# Patient Record
Sex: MALE | Race: WHITE | NOT HISPANIC OR LATINO | Employment: FULL TIME | URBAN - METROPOLITAN AREA
[De-identification: names, ages, dates, MRNs, and addresses within clinical notes are randomized per-mention and may not be internally consistent; named-entity substitution may affect disease eponyms.]

---

## 2019-02-21 ENCOUNTER — OFFICE VISIT (OUTPATIENT)
Dept: FAMILY MEDICINE | Facility: CLINIC | Age: 49
End: 2019-02-21
Payer: COMMERCIAL

## 2019-02-21 VITALS
OXYGEN SATURATION: 98 % | DIASTOLIC BLOOD PRESSURE: 72 MMHG | TEMPERATURE: 97.3 F | HEART RATE: 86 BPM | SYSTOLIC BLOOD PRESSURE: 121 MMHG | WEIGHT: 182 LBS | BODY MASS INDEX: 26.96 KG/M2 | HEIGHT: 69 IN

## 2019-02-21 DIAGNOSIS — B35.1 TOENAIL FUNGUS: ICD-10-CM

## 2019-02-21 DIAGNOSIS — Z00.00 ENCOUNTER FOR ANNUAL PHYSICAL EXAM: ICD-10-CM

## 2019-02-21 DIAGNOSIS — Z13.6 CARDIOVASCULAR SCREENING; LDL GOAL LESS THAN 160: Primary | ICD-10-CM

## 2019-02-21 PROCEDURE — 36415 COLL VENOUS BLD VENIPUNCTURE: CPT | Performed by: FAMILY MEDICINE

## 2019-02-21 PROCEDURE — 99213 OFFICE O/P EST LOW 20 MIN: CPT | Mod: 25 | Performed by: FAMILY MEDICINE

## 2019-02-21 PROCEDURE — 99386 PREV VISIT NEW AGE 40-64: CPT | Performed by: FAMILY MEDICINE

## 2019-02-21 PROCEDURE — 80053 COMPREHEN METABOLIC PANEL: CPT | Performed by: FAMILY MEDICINE

## 2019-02-21 PROCEDURE — 80061 LIPID PANEL: CPT | Performed by: FAMILY MEDICINE

## 2019-02-21 ASSESSMENT — MIFFLIN-ST. JEOR: SCORE: 1688.05

## 2019-02-21 NOTE — LETTER
February 25, 2019      Isaac Zapata  [address]  [place] [address]        Dear ,    We are writing to inform you of your test results.    I have reviewed your recent labs. Here are the results:     -LDL(bad) cholesterol level is elevated, HDL(good) cholesterol level is low which can increase your heart disease risk.  A diet high in fat and simple carbohydrates, genetics and being overweight can contribute to this. ADVISE: exercising 150 minutes of aerobic exercise per week (30 minutes for 5 days per week or 50 minutes for 3 days per week are options) and eating a low saturated fat/low carbohydrate diet are helpful to improve this. In 12 months, you should recheck your fasting cholesterol panel     -Liver and gallbladder tests are normal (ALT,AST, Alk phos, bilirubin), kidney function is normal (Cr, GFR), sodium is normal, potassium is normal, calcium is normal, glucose is normal.     Resulted Orders   Comprehensive metabolic panel   Result Value Ref Range    Sodium 141 133 - 144 mmol/L    Potassium 4.0 3.4 - 5.3 mmol/L    Chloride 107 94 - 109 mmol/L    Carbon Dioxide 28 20 - 32 mmol/L    Anion Gap 6 3 - 14 mmol/L    Glucose 91 70 - 99 mg/dL      Comment:      Fasting specimen    Urea Nitrogen 11 7 - 30 mg/dL    Creatinine 0.86 0.66 - 1.25 mg/dL    GFR Estimate >90 >60 mL/min/[1.73_m2]      Comment:      Non  GFR Calc  Starting 12/18/2018, serum creatinine based estimated GFR (eGFR) will be   calculated using the Chronic Kidney Disease Epidemiology Collaboration   (CKD-EPI) equation.      GFR Estimate If Black >90 >60 mL/min/[1.73_m2]      Comment:       GFR Calc  Starting 12/18/2018, serum creatinine based estimated GFR (eGFR) will be   calculated using the Chronic Kidney Disease Epidemiology Collaboration   (CKD-EPI) equation.      Calcium 9.1 8.5 - 10.1 mg/dL    Bilirubin Total 0.7 0.2 - 1.3 mg/dL    Albumin 4.5 3.4 - 5.0 g/dL    Protein Total 8.2 6.8 -  8.8 g/dL    Alkaline Phosphatase 77 40 - 150 U/L    ALT 68 0 - 70 U/L    AST 28 0 - 45 U/L   Lipid panel reflex to direct LDL Fasting   Result Value Ref Range    Cholesterol 198 <200 mg/dL    Triglycerides 101 <150 mg/dL      Comment:      Fasting specimen    HDL Cholesterol 33 (L) >39 mg/dL    LDL Cholesterol Calculated 145 (H) <100 mg/dL      Comment:      Above desirable:  100-129 mg/dl  Borderline High:  130-159 mg/dL  High:             160-189 mg/dL  Very high:       >189 mg/dl      Non HDL Cholesterol 165 (H) <130 mg/dL      Comment:      Above Desirable:  130-159 mg/dl  Borderline high:  160-189 mg/dl  High:             190-219 mg/dl  Very high:       >219 mg/dl         If you have any questions or concerns, please call the clinic at the number listed above.       Sincerely,        Blu Spain MD

## 2019-02-21 NOTE — PROGRESS NOTES
SUBJECTIVE:   CC: Isaac Zapata is an 48 year old male who presents for preventive health visit.     Healthy Habits:    Do you get at least three servings of calcium containing foods daily (dairy, green leafy vegetables, etc.)? yes    Amount of exercise or daily activities, outside of work:  30 min a day     Problems taking medications regularly No    Medication side effects: No    Have you had an eye exam in the past two years? no    Do you see a dentist twice per year? yes    Do you have sleep apnea, excessive snoring or daytime drowsiness?yes  Snoring sometimes       Patient is overall healthy but he does have toenails which are thickened and noticed slight worsening with thickening and discoloration.  Would like to start some antifungal medication to see if that helps.    Today's PHQ-2 Score:   PHQ-2 ( 1999 Pfizer) 2/21/2019   Q1: Little interest or pleasure in doing things 0   Q2: Feeling down, depressed or hopeless 0   PHQ-2 Score 0       Abuse: Current or Past(Physical, Sexual or Emotional)- No  Do you feel safe in your environment? Yes    Social History     Tobacco Use     Smoking status: Never Smoker     Smokeless tobacco: Never Used   Substance Use Topics     Alcohol use: Yes     Comment: rarely     If you drink alcohol do you typically have >3 drinks per day or >7 drinks per week? No                      Last PSA: No results found for: PSA    Reviewed orders with patient. Reviewed health maintenance and updated orders accordingly - Yes      Reviewed and updated as needed this visit by clinical staff  Allergies  Meds         Reviewed and updated as needed this visit by Provider            ROS:  CONSTITUTIONAL: NEGATIVE for fever, chills, change in weight  INTEGUMENTARY/SKIN: NEGATIVE for worrisome rashes, moles or lesions  EYES: NEGATIVE for vision changes or irritation  ENT: NEGATIVE for ear, mouth and throat problems  RESP: NEGATIVE for significant cough or SOB  CV: NEGATIVE for chest pain,  "palpitations or peripheral edema  GI: NEGATIVE for nausea, abdominal pain, heartburn, or change in bowel habits   male: negative for dysuria, hematuria, decreased urinary stream, erectile dysfunction, urethral discharge  MUSCULOSKELETAL: NEGATIVE for significant arthralgias or myalgia  NEURO: NEGATIVE for weakness, dizziness or paresthesias  PSYCHIATRIC: NEGATIVE for changes in mood or affect    OBJECTIVE:   Pulse 86   Temp 97.3  F (36.3  C) (Tympanic)   Ht 1.74 m (5' 8.5\")   Wt 82.6 kg (182 lb)   SpO2 98%   BMI 27.27 kg/m    EXAM:  GENERAL: healthy, alert and no distress  EYES: Eyes grossly normal to inspection, PERRL and conjunctivae and sclerae normal  HENT: ear canals and TM's normal, nose and mouth without ulcers or lesions  NECK: no adenopathy, no asymmetry, masses, or scars and thyroid normal to palpation  RESP: lungs clear to auscultation - no rales, rhonchi or wheezes  CV: regular rate and rhythm, normal S1 S2, no S3 or S4, no murmur, click or rub, no peripheral edema and peripheral pulses strong  ABDOMEN: soft, nontender, no hepatosplenomegaly, no masses and bowel sounds normal  MS: no gross musculoskeletal defects noted, no edema  SKIN: no suspicious lesions or rashes  NEURO: Normal strength and tone, mentation intact and speech normal  PSYCH: mentation appears normal, affect normal/bright  Bilateral nail have thickened appearence with some discoloration.    ASSESSMENT/PLAN:   1. CARDIOVASCULAR SCREENING; LDL GOAL LESS THAN 160    - Comprehensive metabolic panel  - Lipid panel reflex to direct LDL Fasting    2. Encounter for annual physical exam    - Comprehensive metabolic panel  - Lipid panel reflex to direct LDL Fasting    3. Toenail fungus  We discussed about antifungal medication Lamisil.  I advised him we need to check his liver function now and while taking the medication needs to get his liver function checked every month.  Once I reviewed his liver function is within normal I will send the " "medication Lamisil 250 mg daily.. I defects were discussed with the patient      COUNSELING:  Reviewed preventive health counseling, as reflected in patient instructions       Regular exercise       Healthy diet/nutrition    BP Readings from Last 1 Encounters:   08/24/09 120/60     Estimated body mass index is 27.27 kg/m  as calculated from the following:    Height as of this encounter: 1.74 m (5' 8.5\").    Weight as of this encounter: 82.6 kg (182 lb).           reports that  has never smoked. he has never used smokeless tobacco.      Counseling Resources:  ATP IV Guidelines  Pooled Cohorts Equation Calculator  FRAX Risk Assessment  ICSI Preventive Guidelines  Dietary Guidelines for Americans, 2010  USDA's MyPlate  ASA Prophylaxis  Lung CA Screening    Blu Spain MD  [facility] [place]  "

## 2019-02-22 LAB
ALBUMIN SERPL-MCNC: 4.5 G/DL (ref 3.4–5)
ALP SERPL-CCNC: 77 U/L (ref 40–150)
ALT SERPL W P-5'-P-CCNC: 68 U/L (ref 0–70)
ANION GAP SERPL CALCULATED.3IONS-SCNC: 6 MMOL/L (ref 3–14)
AST SERPL W P-5'-P-CCNC: 28 U/L (ref 0–45)
BILIRUB SERPL-MCNC: 0.7 MG/DL (ref 0.2–1.3)
BUN SERPL-MCNC: 11 MG/DL (ref 7–30)
CALCIUM SERPL-MCNC: 9.1 MG/DL (ref 8.5–10.1)
CHLORIDE SERPL-SCNC: 107 MMOL/L (ref 94–109)
CHOLEST SERPL-MCNC: 198 MG/DL
CO2 SERPL-SCNC: 28 MMOL/L (ref 20–32)
CREAT SERPL-MCNC: 0.86 MG/DL (ref 0.66–1.25)
GFR SERPL CREATININE-BSD FRML MDRD: >90 ML/MIN/{1.73_M2}
GLUCOSE SERPL-MCNC: 91 MG/DL (ref 70–99)
HDLC SERPL-MCNC: 33 MG/DL
LDLC SERPL CALC-MCNC: 145 MG/DL
NONHDLC SERPL-MCNC: 165 MG/DL
POTASSIUM SERPL-SCNC: 4 MMOL/L (ref 3.4–5.3)
PROT SERPL-MCNC: 8.2 G/DL (ref 6.8–8.8)
SODIUM SERPL-SCNC: 141 MMOL/L (ref 133–144)
TRIGL SERPL-MCNC: 101 MG/DL

## 2019-02-27 ENCOUNTER — TELEPHONE (OUTPATIENT)
Dept: FAMILY MEDICINE | Facility: CLINIC | Age: 49
End: 2019-02-27

## 2019-02-27 NOTE — TELEPHONE ENCOUNTER
Faxed patient's biometric form to [phone]      .Mehreen FARIAS    [facility]

## 2020-02-06 ENCOUNTER — OFFICE VISIT - HEALTHEAST (OUTPATIENT)
Dept: FAMILY MEDICINE | Facility: CLINIC | Age: 50
End: 2020-02-06

## 2020-02-06 ENCOUNTER — COMMUNICATION - HEALTHEAST (OUTPATIENT)
Dept: TELEHEALTH | Facility: CLINIC | Age: 50
End: 2020-02-06

## 2020-02-06 DIAGNOSIS — Z71.84 COUNSELING ABOUT TRAVEL: ICD-10-CM

## 2020-02-06 ASSESSMENT — MIFFLIN-ST. JEOR: SCORE: 1712.91

## 2020-07-28 ENCOUNTER — VIRTUAL VISIT (OUTPATIENT)
Dept: FAMILY MEDICINE | Facility: CLINIC | Age: 50
End: 2020-07-28
Payer: COMMERCIAL

## 2020-07-28 DIAGNOSIS — H01.004 BLEPHARITIS OF LEFT UPPER EYELID, UNSPECIFIED TYPE: Primary | ICD-10-CM

## 2020-07-28 PROCEDURE — 99213 OFFICE O/P EST LOW 20 MIN: CPT | Mod: 95 | Performed by: FAMILY MEDICINE

## 2020-07-28 RX ORDER — NEOMYCIN/POLYMYXIN B/HYDROCORT 3.5-10K-1
2 SUSPENSION, DROPS(FINAL DOSAGE FORM)(ML) OPHTHALMIC (EYE) 4 TIMES DAILY
Qty: 7.5 ML | Refills: 0 | Status: SHIPPED | OUTPATIENT
Start: 2020-07-28 | End: 2020-08-04

## 2020-07-28 NOTE — PROGRESS NOTES
"Isaac Zapata is a 50 year old male who is being evaluated via a billable video visit.      The patient has been notified of following:     \"This video visit will be conducted via a call between you and your physician/provider. We have found that certain health care needs can be provided without the need for an in-person physical exam.  This service lets us provide the care you need with a video conversation.  If a prescription is necessary we can send it directly to your pharmacy.  If lab work is needed we can place an order for that and you can then stop by our lab to have the test done at a later time.    Video visits are billed at different rates depending on your insurance coverage.  Please reach out to your insurance provider with any questions.    If during the course of the call the physician/provider feels a video visit is not appropriate, you will not be charged for this service.\"    Patient has given verbal consent for Video visit? Yes  How would you like to obtain your AVS? MyChart  If you are dropped from the video visit, the video invite should be resent to: Send to e-mail at: [email or web address]  Will anyone else be joining your video visit? No      Subjective     Isaac Zapata is a 50 year old male who presents today via video visit for the following health issues:    HPI    Eye(s) Problem      Duration: 2 days    Description:  Location: left  Pain: YES  Redness: YES  Discharge: YES    Accompanying signs and symptoms: slight swelling    History (Trauma, foreign body exposure,): None    Precipitating or alleviating factors (contact use): None    Therapies tried and outcome: None    Denies any trauma.  Denies any bug bite.  No medications tried.  Denies any pus drainage.  It is mostly teary.  This never happened before.  Video Start Time: 9:44 AM        Patient Active Problem List   Diagnosis     CARDIOVASCULAR SCREENING; LDL GOAL LESS THAN 160     No past surgical history on file.    Social History "     Tobacco Use     Smoking status: Never Smoker     Smokeless tobacco: Never Used   Substance Use Topics     Alcohol use: Yes     Comment: rarely     Family History   Problem Relation Age of Onset     Diabetes Father          age 58     Hypertension Mother      Family History Negative Sister      Family History Negative Sister      Family History Negative Brother      Family History Negative Brother      Family History Negative Brother          Current Outpatient Medications   Medication Sig Dispense Refill     neomycin-polymyxin-hydrocortisone (CORTISPORIN) 3.5-77365-3 ophthalmic suspension Place 2 drops Into the left eye 4 times daily for 7 days 7.5 mL 0     ALLEGRA 180 MG OR TABS TAKE ONE DAILY (Patient not taking: No sig reported) 30 1     Allergies   Allergen Reactions     Asa [Aspirin] Swelling       Reviewed and updated as needed this visit by Provider         Review of Systems   CONSTITUTIONAL: NEGATIVE for fever, chills, change in weight  ENT/MOUTH: NEGATIVE for ear, mouth and throat problems  RESP: NEGATIVE for significant cough or SOB  CV: NEGATIVE for chest pain, palpitations or peripheral edema      Objective             Physical Exam     GENERAL: Healthy, alert and no distress  EYES: Left upper eyelid is swollen and erythematous.  Patient does not have any pain with moving his eyeball in either direction.  Conjunctivae is clear.  No unusual drainage noted.  RESP: No audible wheeze, cough, or visible cyanosis.  No visible retractions or increased work of breathing.    NEURO: Cranial nerves grossly intact.  Mentation and speech appropriate for age.  PSYCH: Mentation appears normal, affect normal/bright, judgement and insight intact, normal speech and appearance well-groomed.              Assessment & Plan     1. Blepharitis of left upper eyelid, unspecified type  Patient has symptoms of blepharitis.  Recommending to use Cortisporin eyedrops 4 times a day.  Use ibuprofen to lower the inflammation.   Cool compresses recommended.  If any symptomatic worsening or changes noted, he is instructed to notify us back.  Patient verbalized understanding and agreement  - neomycin-polymyxin-hydrocortisone (CORTISPORIN) 3.5-06980-5 ophthalmic suspension; Place 2 drops Into the left eye 4 times daily for 7 days  Dispense: 7.5 mL; Refill: 0           No follow-ups on file.    Adithya Fuentes MD  [facility] [place]      [place]-Visit Details    Type of service:  Video Visit    Video End Time:9:53 AM    Originating Location (pt. Location): Home    [place]  [facility] [place]     Platform used for Video Visit: Well    No follow-ups on file.       Adithya Fuentes MD

## 2020-08-21 ENCOUNTER — VIRTUAL VISIT (OUTPATIENT)
Dept: FAMILY MEDICINE | Facility: CLINIC | Age: 50
End: 2020-08-21
Payer: COMMERCIAL

## 2020-08-21 DIAGNOSIS — J30.2 SEASONAL ALLERGIES: Primary | ICD-10-CM

## 2020-08-21 PROCEDURE — 99213 OFFICE O/P EST LOW 20 MIN: CPT | Mod: 95 | Performed by: INTERNAL MEDICINE

## 2020-08-21 NOTE — PROGRESS NOTES
"Isaca Zapata is a 50 year old male who is being evaluated via a billable video visit.      The patient has been notified of following:     \"This video visit will be conducted via a call between you and your physician/provider. We have found that certain health care needs can be provided without the need for an in-person physical exam.  This service lets us provide the care you need with a video conversation.  If a prescription is necessary we can send it directly to your pharmacy.  If lab work is needed we can place an order for that and you can then stop by our lab to have the test done at a later time.    Video visits are billed at different rates depending on your insurance coverage.  Please reach out to your insurance provider with any questions.    If during the course of the call the physician/provider feels a video visit is not appropriate, you will not be charged for this service.\"    Patient has given verbal consent for Video visit? Yes  How would you like to obtain your AVS? MyChart  If you are dropped from the video visit, the video invite should be resent to: Text to cell phone: [phone]  Will anyone else be joining your video visit? No    Subjective     Isaac Zapata is a 50 year old male who presents today via video visit for the following health issues:    HPI    ALLERGIES  Onset/Duration: started this AM - employer would like note stating okay to work  Symptoms:   Nasal congestion: YES  Sneezing: YES  Red, itchy eyes: Watering   Progression of Symptoms: same waxing and waning  Accompanying Signs & Symptoms:  Cough: no  Wheezing: no  Rash: no  Sinus/facial pain: YES  History:   Is it seasonal: in the fall, in the spring and in the summer   History of Asthma: no  Has allergy testing been done: no  Precipitating factors:   None  Alleviating factors:  Fatigue and muscle aches  Therapies tried and outcome: Has used Allegra in the past     Isaac is having his typical allergy symptoms. Happens every " year around this time - he has watery eyes, runny nose, sneezing, feels a little tired and achy, but nothing like the flu.  He usually takes allegra and that works pretty quickly to help him feel better, but he needs to go to the store to get some. He works as an .  He started sneezing at work this morning and his boss sent him home.  He has not had any known COVID exposures. There was a positive coworker at his work about a month ago, and he wasn't in close contact with them.       Video Start Time: 2:00 PM        Review of Systems   Constitutional, HEENT, cardiovascular, pulmonary, gi and gu systems are negative, except as otherwise noted.      Objective           Vitals:  No vitals were obtained today due to virtual visit.    Physical Exam     GENERAL: Healthy, alert and no distress  EYES: Eyes grossly normal to inspection.  No discharge or erythema, or obvious scleral/conjunctival abnormalities.  RESP: No audible wheeze, cough, or visible cyanosis.  No visible retractions or increased work of breathing.    SKIN: Visible skin clear. No significant rash, abnormal pigmentation or lesions.  NEURO: Cranial nerves grossly intact.  Mentation and speech appropriate for age.  PSYCH: Mentation appears normal, affect normal/bright, judgement and insight intact, normal speech and appearance well-groomed.              Assessment & Plan     Seasonal allergies  His symptoms are very typical for seasonal allergies.  I have a very low suspicion for COVID.  Though as with anything in medicine, nothing is 100% certain.  I have written this in a letter, he will access through [employer].  He is going to buy some allegra today.            Return in about 6 months (around 2/21/2021) for Physical Exam.    Lala Terrell MD  [facility] [place]      Video-Visit Details    Type of service:  Video Visit    Video End Time: 2:08 PM    Originating Location (pt. Location): Home    [place]  [facility] [place]     Platform used for Video Visit: [employer]

## 2020-08-21 NOTE — LETTER
[facility] [place]          [address] [place] [address]                            [phone]  Fax: [phone]    Isaac Zapata  [address] [address]    [id number]    August 21, 2020      To whom it may concern    Please excuse Isaac Zapata from work 8/21/2020.  He was evaluated via a video visit for his symptoms.  His symptoms are consistent with seasonal allergies.  I have a low suspicion for COVID and have not recommended that he be tested.  I feel it is safe for him to return to work with continued universal precautions of masking and social distancing as his job allows.  If you have any other questions or concerns please feel free to contact me at anytime.        Sincerely,     Lala Terrell MD

## 2020-10-27 ENCOUNTER — VIRTUAL VISIT (OUTPATIENT)
Dept: FAMILY MEDICINE | Facility: CLINIC | Age: 50
End: 2020-10-27
Payer: COMMERCIAL

## 2020-10-27 DIAGNOSIS — Z20.822 EXPOSURE TO COVID-19 VIRUS: Primary | ICD-10-CM

## 2020-10-27 PROCEDURE — 99213 OFFICE O/P EST LOW 20 MIN: CPT | Mod: 95 | Performed by: FAMILY MEDICINE

## 2020-10-27 NOTE — PROGRESS NOTES
"Isaac Zapata is a 50 year old male who is being evaluated via a billable video visit.      The patient has been notified of following:     \"This video visit will be conducted via a call between you and your physician/provider. We have found that certain health care needs can be provided without the need for an in-person physical exam.  This service lets us provide the care you need with a video conversation.  If a prescription is necessary we can send it directly to your pharmacy.  If lab work is needed we can place an order for that and you can then stop by our lab to have the test done at a later time.    Video visits are billed at different rates depending on your insurance coverage.  Please reach out to your insurance provider with any questions.    If during the course of the call the physician/provider feels a video visit is not appropriate, you will not be charged for this service.\"    Patient has given verbal consent for Video visit? Yes  How would you like to obtain your AVS? MyChart  If you are dropped from the video visit, the video invite should be resent to: Text to cell phone: [phone]   Will anyone else be joining your video visit? No      Subjective     Isaac Zapata is a 50 year old male who presents today via video visit for the following health issues:    HPI       Concern for COVID-19  About how many days ago did these symptoms start? Saturday   Is this your first visit for this illness? Yes  In the 14 days before your symptoms started, have you had close contact with someone with COVID-19 (Coronavirus)? Yes, I have been in contact with someone who has COVID-19/Coronavirus (confirmed by lab test).  Do you have a fever or chills? No  Are you having new or worsening difficulty breathing? No  Do you have new or worsening cough? No  Have you had any new or unexplained body aches? YES    Have you experienced any of the following NEW symptoms?    Headache: No    Sore throat: YES    Loss of taste " or smell: No    Chest pain: No    Diarrhea: No    Rash: No  What treatments have you tried?   Who do you live with? Family   Are you, or a household member, a healthcare worker or a ? No  Do you live in a nursing home, group home, or shelter? No  Do you have a way to get food/medications if quarantined? NO               Video Start Time: 13:00      Review of Systems   Constitutional, HEENT, cardiovascular, pulmonary, gi and gu systems are negative, except as otherwise noted.      Objective           Vitals:  No vitals were obtained today due to virtual visit.    Physical Exam     GENERAL: Healthy, alert and no distress  EYES: Eyes grossly normal to inspection.  No discharge or erythema, or obvious scleral/conjunctival abnormalities.  HENT: Normal cephalic/atraumatic.  External ears, nose and mouth without ulcers or lesions.  No nasal drainage visible.  NECK: No asymmetry, visible masses or scars  RESP: No audible wheeze, cough, or visible cyanosis.  No visible retractions or increased work of breathing.    SKIN: Visible skin clear. No significant rash, abnormal pigmentation or lesions.  NEURO: Cranial nerves grossly intact.  Mentation and speech appropriate for age.  PSYCH: Mentation appears normal, affect normal/bright, judgement and insight intact, normal speech and appearance well-groomed.              Assessment & Plan     Exposure to COVID-19 virus  Has worsening sinus sx with cough, has no fever yet, will have him to check covid testing for further evaluation   Encouraged him to work on self quarantine until having the test results   - Symptomatic COVID-19 Virus (Coronavirus) by PCR; Future        FUTURE APPOINTMENTS:       - Follow-up visit in 2 weeks as needed     No follow-ups on file.    Colby Hamilton MD  [facility]      Video-Visit Details    Type of service:  Video Visit    Video End Time:13:20    Originating Location (pt. Location): Home    Distant Location  (provider location):  [facility]     Platform used for Video Visit: [employer]

## 2020-10-29 DIAGNOSIS — Z20.822 EXPOSURE TO COVID-19 VIRUS: ICD-10-CM

## 2020-10-29 PROCEDURE — U0003 INFECTIOUS AGENT DETECTION BY NUCLEIC ACID (DNA OR RNA); SEVERE ACUTE RESPIRATORY SYNDROME CORONAVIRUS 2 (SARS-COV-2) (CORONAVIRUS DISEASE [COVID-19]), AMPLIFIED PROBE TECHNIQUE, MAKING USE OF HIGH THROUGHPUT TECHNOLOGIES AS DESCRIBED BY CMS-2020-01-R: HCPCS | Performed by: FAMILY MEDICINE

## 2020-10-30 ENCOUNTER — TELEPHONE (OUTPATIENT)
Dept: EMERGENCY MEDICINE | Facility: CLINIC | Age: 50
End: 2020-10-30

## 2020-10-30 LAB
SARS-COV-2 RNA SPEC QL NAA+PROBE: ABNORMAL
SPECIMEN SOURCE: ABNORMAL

## 2020-10-30 NOTE — TELEPHONE ENCOUNTER
"Coronavirus (COVID-19) Notification    Caller Name (Patient, parent, daughter/son, grandparent, etc)  Patient     Reason for call  Notify of Positive Coronavirus (COVID-19) lab results, assess symptoms,  review [facility] recommendations    Lab Result    Lab test:  2019-nCoV rRt-PCR or SARS-CoV-2 PCR    Oropharyngeal AND/OR nasopharyngeal swabs is POSITIVE for 2019-nCoV RNA/SARS-COV-2 PCR (COVID-19 virus)    RN Recommendations/Instructions per [facility] Coronavirus COVID-19 recommendations    Brief introduction script  Introduce self then review script:  \"I am calling on behalf of [employer].  We were notified that your Coronavirus test (COVID-19) for was POSITIVE for the virus.  I have some information to relay to you but first I wanted to mention that the [place] Dept of Health will be contacting you shortly [it's possible MD already called Patient] to talk to you more about how you are feeling and other people you have had contact with who might now also have the virus.  Also, [facility] is Partnering with the [facility] for Covid-19 research, you may be contacted directly by research staff.\"    Assessment (Inquire about Patient's current symptoms)   Assessment   Current Symptoms at time of phone call: (if no symptoms, document No symptoms]  headache, body aches, chills, no smell and taste, cough    Symptoms onset (if applicable)  10/24/20     If at time of call, Patients symptoms hare worsened, the Patient should contact 911 or have someone drive them to Emergency Dept promptly:      If Patient calling 911, inform 911 personal that you have tested positive for the Coronavirus (COVID-19).  Place mask on and await 911 to arrive.    If Emergency Dept, If possible, please have another adult drive you to the Emergency Dept but you need to wear mask when in contact with other people.      Review information with Patient    Your result was positive. This means you have COVID-19 " (coronavirus).  We have sent you a letter that reviews the information that I'll be reviewing with you now.    How can I protect others?    If you have symptoms: stay home and away from others (self-isolate) until:    You've had no fever--and no medicine that reduces fever--for 1 full day (24 hours). And       Your other symptoms have gotten better. For example, your cough or breathing has improved. And     At least 10 days have passed since your symptoms started. (If you've been told by a doctor that you have a weak immune system, wait 20 days.)     If you don't have symptoms: Stay home and away from others (self-isolate) until at least 10 days have passed since your first positive COVID-19 test. (Date test collected)    During this time:    Stay in your own room, including for meals. Use your own bathroom if you can.    Stay away from others in your home. No hugging, kissing or shaking hands. No visitors.     Don't go to work, school or anywhere else.     Clean  high touch  surfaces often (doorknobs, counters, handles, etc.). Use a household cleaning spray or wipes. You'll find a full list on the EPA website at www.epa.gov/pesticide-registration/list-n-disinfectants-use-against-sars-cov-2.     Cover your mouth and nose with a mask, tissue or other face covering to avoid spreading germs.    Wash your hands and face often with soap and water.    Caregivers in these groups are at risk for severe illness due to COVID-19:  o People 65 years and older  o People who live in a nursing home or long-term care facility  o People with chronic disease (lung, heart, cancer, diabetes, kidney, liver, immunologic)  o People who have a weakened immune system, including those who:  - Are in cancer treatment  - Take medicine that weakens the immune system, such as corticosteroids  - Had a bone marrow or organ transplant  - Have an immune deficiency  - Have poorly controlled HIV or AIDS  - Are obese (body mass index of 40 or  higher)  - Smoke regularly    Caregivers should wear gloves while washing dishes, handling laundry and cleaning bedrooms and bathrooms.    Wash and dry laundry with special caution. Don't shake dirty laundry, and use the warmest water setting you can.    If you have a weakened immune system, ask your doctor about other actions you should take.    For more tips, go to www.cdc.gov/coronavirus/2019-ncov/downloads/10Things.pdf.    You should not go back to work until you meet the guidelines above for ending your home isolation. You don't need to be retested for COVID-19 before going back to work--studies show that you won't spread the virus if it's been at least 10 days since your symptoms started (or 20 days, if you have a weak immune system).    Employers: This document serves as formal notice of your employee's medical guidelines for going back to work. They must meet the above guidelines before going back to work in person.    How can I take care of myself?    1. Get lots of rest. Drink extra fluids (unless a doctor has told you not to).    2. Take Tylenol (acetaminophen) for fever or pain. If you have liver or kidney problems, ask your family doctor if it's okay to take Tylenol.     Take either:     650 mg (two 325 mg pills) every 4 to 6 hours, or     1,000 mg (two 500 mg pills) every 8 hours as needed.     Note: Don't take more than 3,000 mg in one day. Acetaminophen is found in many medicines (both prescribed and over-the-counter medicines). Read all labels to be sure you don't take too much.    For children, check the Tylenol bottle for the right dose (based on their age or weight).    3. If you have other health problems (like cancer, heart failure, an organ transplant or severe kidney disease): Call your specialty clinic if you don't feel better in the next 2 days.    4. Know when to call 911: Emergency warning signs include:    Trouble breathing or shortness of breath    Pain or pressure in the chest that  doesn't go away    Feeling confused like you haven't felt before, or not being able to wake up    Bluish-colored lips or face    5. Sign up for [employer]. We know it's scary to hear that you have COVID-19. We want to track your symptoms to make sure you're okay over the next 2 weeks. Please look for an email from [employer]--this is a free, online program that we'll use to keep in touch. To sign up, follow the link in the email. Learn more at [email or web address].    Where can I get more information?    [facility]: [email or web address]    Coronavirus Basics: www.health.[facility].[place]./diseases/coronavirus/basics.html    What to Do If You're Sick: www.cdc.gov/coronavirus/2019-ncov/about/steps-when-sick.html    Ending Home Isolation: www.cdc.gov/coronavirus/2019-ncov/hcp/disposition-in-home-patients.html     Caring for Someone with COVID-19: www.cdc.gov/coronavirus/2019-ncov/if-you-are-sick/care-for-someone.html     [facility] clinical trials (COVID-19 research studies): [email or web address].[email or web address]-clinical-trials     A Positive COVID-19 letter will be sent via [employer] or the mail. (Exception, no letters sent to Presurgerical/Preprocedure Patients)    [Name]  Rinku Chowdhury RN  [facility]  COVID19 Results Team RN  Ph# [phone]

## 2021-01-15 ENCOUNTER — HEALTH MAINTENANCE LETTER (OUTPATIENT)
Age: 51
End: 2021-01-15

## 2021-04-01 ENCOUNTER — COMMUNICATION - HEALTHEAST (OUTPATIENT)
Dept: FAMILY MEDICINE | Facility: CLINIC | Age: 51
End: 2021-04-01

## 2021-04-01 DIAGNOSIS — Z23 NEED FOR VACCINATION: ICD-10-CM

## 2021-04-07 ENCOUNTER — AMBULATORY - HEALTHEAST (OUTPATIENT)
Dept: NURSING | Facility: CLINIC | Age: 51
End: 2021-04-07

## 2021-04-07 ENCOUNTER — COMMUNICATION - HEALTHEAST (OUTPATIENT)
Dept: FAMILY MEDICINE | Facility: CLINIC | Age: 51
End: 2021-04-07

## 2021-04-07 DIAGNOSIS — Z23 NEED FOR VACCINATION: ICD-10-CM

## 2021-06-03 ENCOUNTER — COMMUNICATION - HEALTHEAST (OUTPATIENT)
Dept: FAMILY MEDICINE | Facility: CLINIC | Age: 51
End: 2021-06-03

## 2021-06-03 ENCOUNTER — AMBULATORY - HEALTHEAST (OUTPATIENT)
Dept: FAMILY MEDICINE | Facility: CLINIC | Age: 51
End: 2021-06-03

## 2021-06-03 DIAGNOSIS — Z00.00 HEALTH CARE MAINTENANCE: ICD-10-CM

## 2021-06-04 VITALS
BODY MASS INDEX: 28.1 KG/M2 | DIASTOLIC BLOOD PRESSURE: 80 MMHG | SYSTOLIC BLOOD PRESSURE: 112 MMHG | OXYGEN SATURATION: 96 % | HEIGHT: 69 IN | HEART RATE: 86 BPM | WEIGHT: 189.7 LBS

## 2021-06-05 NOTE — PROGRESS NOTES
Assessment and Plan:     1. Counseling about travel  Provided hepatitis A and hepatitis B vaccines.  Provided prescription for oral typhoid vaccine.  Educated on indications and side effects.  We reviewed malaria prophylaxis and he is not interested in this.  He declines influenza vaccine.  Provided prescription for Cipro to take as needed for traveler's diarrhea.  He does not feel as though he is at risk for rabies, so declines this vaccine today.  He will follow-up as needed.  I encouraged him to schedule a male physical.  - typhoid (VIVOTIF) SR capsule; Take 1 capsule by mouth every other day for 4 doses.  Dispense: 4 capsule; Refill: 0  - Hepatitis A adult 2 dose IM (19 yr & older)  - Hepatitis B Vaccine Age 20 years and above  - ciprofloxacin HCl (CIPRO) 500 MG tablet; Take 1 tablet (500 mg total) by mouth 2 (two) times a day for 5 days. As needed for traveler's diarrhea  Dispense: 10 tablet; Refill: 0      Subjective:     Isaac is a 49 y.o. male presenting to the clinic for a travel consult.  Patient is traveling on a mission trip with medical providers from his [employer].  They are traveling to [place] in April.  He speaks Portuguese, so he will translate for them.  He is leaving 4/18/2020 returning 4/25/2020.  He is feeling well today.  He denies recent cold symptoms or fever including rhinorrhea, postnasal drainage, cough, headache, stomachache, nausea, vomiting, fever.  He was told that he does not need malaria prophylaxis.    Review of Systems: A complete 14 point review of systems was obtained and is negative or as stated in the history of present illness.    Social History     Socioeconomic History     Marital status:      Spouse name: Not on file     Number of children: Not on file     Years of education: Not on file     Highest education level: Not on file   Occupational History     Not on file   Social Needs     Financial resource strain: Not on file     Food insecurity:     Worry: Not on file      "Inability: Not on file     Transportation needs:     Medical: Not on file     Non-medical: Not on file   Tobacco Use     Smoking status: Never Smoker     Smokeless tobacco: Never Used   Substance and Sexual Activity     Alcohol use: Not on file     Drug use: Not on file     Sexual activity: Not on file   Lifestyle     Physical activity:     Days per week: Not on file     Minutes per session: Not on file     Stress: Not on file   Relationships     Social connections:     Talks on phone: Not on file     Gets together: Not on file     Attends Anabaptism service: Not on file     Active member of club or organization: Not on file     Attends meetings of clubs or organizations: Not on file     Relationship status: Not on file     Intimate partner violence:     Fear of current or ex partner: Not on file     Emotionally abused: Not on file     Physically abused: Not on file     Forced sexual activity: Not on file   Other Topics Concern     Not on file   Social History Narrative     Not on file       Active Ambulatory Problems     Diagnosis Date Noted     No Active Ambulatory Problems     Resolved Ambulatory Problems     Diagnosis Date Noted     No Resolved Ambulatory Problems     No Additional Past Medical History       No family history on file.    Objective:     /80 (Patient Site: Left Arm, Cuff Size: Adult Regular)   Pulse 86   Ht 5' 8.5\" (1.74 m)   Wt 189 lb 11.2 oz (86 kg)   SpO2 96%   BMI 28.42 kg/m      Patient is alert, in no obvious distress.   Skin: Warm, dry.  No lesions or rashes.  Skin turgor rapid return.   HEENT:  Head normocephalic, atraumatic.  Eyes normal.  Ears normal.  Nose patent, mucosa pink.  Oropharynx mucosa pink.  No lesions or tonsillar enlargement.   Neck: Supple, no lymphadenopathy.   Lungs:  Clear to auscultation. Respirations even and unlabored.  No wheezing or rales noted.   Heart:  Regular rate and rhythm.  No murmurs.                "

## 2021-06-16 NOTE — TELEPHONE ENCOUNTER
Patient came in today for his second Hep A and Hep B vaccine. He started his Hep B Feb. 2020, spoke with a provider in regards to when it would be appropriate for patient to get the 3rd dose, provider advise that patient just restart the Hep B vaccination. So his first dose was done on 4/7/21, second one will be completed 6/7/21. And finally last one should be completed around Oct. 2021- 6 months after the first.

## 2021-06-16 NOTE — TELEPHONE ENCOUNTER
Reason for Call: Request for an order or referral:    Order or referral being requested: Order for patient to complete Hep A & Hep B 2nd dose    Date needed: as soon as possible    Has the patient been seen by the PCP for this problem? YES and Not Applicable    Additional comments: Patient is scheduled for a nurse only visit for Hep A & B on 04.07.2021    Phone number Patient can be reached at:  Home number on file [phone] (home)    Best Time:  anytiime    Can we leave a detailed message on this number?  Yes    Call taken on 4/1/2021 at 8:47 AM by Mecca Alvarez

## 2021-06-16 NOTE — TELEPHONE ENCOUNTER
Pt returned call and states he just updated his tetanus vaccine yesterday at [facility]. Chart updated, order cancelled.

## 2021-06-16 NOTE — TELEPHONE ENCOUNTER
Looks like last tetanus shot was over 10 years ago.  I placed an order for Td also.  Please notify patient to confirm that he is okay with receiving this at his scheduled appointment.

## 2021-06-25 NOTE — TELEPHONE ENCOUNTER
This patient has a nurse only appointment for a hepatitis B vaccine.    The notes say this is for his second hepatitis B but I believe this should be his third.    Hep B immunizations per record:   2/6/2020 4/7/2021    Is this his second or third vaccine?   If this is his third vaccine when should he receive it given the dates of the first two?     Please place future order for Hep B vaccine.

## 2021-07-04 NOTE — ADDENDUM NOTE
Addendum Note by Nuvia Carrington CMA at 4/2/2021  8:14 AM     Author: Nuvia Carrington CMA Service: -- Author Type: Certified Medical Assistant    Filed: 4/2/2021  8:14 AM Encounter Date: 4/1/2021 Status: Signed    : Nuvia Carrington CMA (Certified Medical Assistant)    Addended by: NUVIA CARRINGTON on: 4/2/2021 08:14 AM        Modules accepted: Orders

## 2021-09-04 ENCOUNTER — HEALTH MAINTENANCE LETTER (OUTPATIENT)
Age: 51
End: 2021-09-04

## 2022-01-24 ENCOUNTER — TELEPHONE (OUTPATIENT)
Dept: SURGERY | Facility: CLINIC | Age: 52
End: 2022-01-24

## 2022-01-24 ENCOUNTER — OFFICE VISIT (OUTPATIENT)
Dept: SURGERY | Facility: CLINIC | Age: 52
End: 2022-01-24
Payer: COMMERCIAL

## 2022-01-24 VITALS
SYSTOLIC BLOOD PRESSURE: 110 MMHG | BODY MASS INDEX: 27.99 KG/M2 | RESPIRATION RATE: 16 BRPM | HEART RATE: 69 BPM | HEIGHT: 69 IN | DIASTOLIC BLOOD PRESSURE: 78 MMHG | WEIGHT: 189 LBS | OXYGEN SATURATION: 97 %

## 2022-01-24 DIAGNOSIS — R22.2 MASS ON BACK: Primary | ICD-10-CM

## 2022-01-24 PROCEDURE — 99202 OFFICE O/P NEW SF 15 MIN: CPT | Performed by: SURGERY

## 2022-01-24 ASSESSMENT — MIFFLIN-ST. JEOR: SCORE: 1704.74

## 2022-01-24 NOTE — LETTER
2022    RE: Isaac Zapata, : 1970      The patient underwent excision of a right upper back mass at the base of his neck in .  This was a sebaceous cyst according to old notes.  The lesion has now recurred.  The initial lesion occasionally drained material at the skin.  The recurrence has never drained.  It is not tender.     The right upper back at the base of the neck reveals a 4 x 6 cm mass.  There is an overlying scar, but no evidence of a punctum.  The mass is firm but does not appear fixed to underlying tissue.     Assessment and plan: This is a patient with a recurrent mass on his right upper back at the base of his neck.  This likely represents a recurrent cyst, rather than a neoplastic lesion.  I have recommended excision and we discussed anesthetic options.  The patient prefers a local anesthetic.  We will work on getting this arranged for him in the near future.  We did discuss the procedure, along with its risks and complications, in detail.  The patient has agreed to proceed.       Catarino Zuñiga MD  [facility]

## 2022-01-24 NOTE — PROGRESS NOTES
The patient underwent excision of a right upper back mass at the base of his neck in 2014.  This was a sebaceous cyst according to old notes.  The lesion has now recurred.  The initial lesion occasionally drained material at the skin.  The recurrence has never drained.  It is not tender.    The right upper back at the base of the neck reveals a 4 x 6 cm mass.  There is an overlying scar, but no evidence of a punctum.  The mass is firm but does not appear fixed to underlying tissue.    Assessment and plan: This is a patient with a recurrent mass on his right upper back at the base of his neck.  This likely represents a recurrent cyst, rather than a neoplastic lesion.  I have recommended excision and we discussed anesthetic options.  The patient prefers a local anesthetic.  We will work on getting this arranged for him in the near future.  We did discuss the procedure, along with its risks and complications, in detail.  The patient has agreed to proceed.    Catarino Zuñiga MD  [facility]    Please route or send letter to:  Primary Care Provider (PCP)

## 2022-01-24 NOTE — TELEPHONE ENCOUNTER
Type of surgery: EXCISION OF RIGHT BACK MASS  Location of surgery: [facility]  Date and time of surgery: 3-10-22, 8:45 AM   Surgeon: DR. CHU   Pre-Op Appt Date: NA  Post-Op Appt Date: PATIENT TO SCHEDULE   Packet sent out: GIVEN TO PATIENT   Pre-cert/Authorization completed:  Not Applicable  Date: 1-24-22      EXCISION OF RIGHT BACK MASS   LOCAL   60 MINS REQ  PA ASSIST DFB (Max to assist if avail)   ALW

## 2022-02-09 DIAGNOSIS — Z11.59 ENCOUNTER FOR SCREENING FOR OTHER VIRAL DISEASES: Primary | ICD-10-CM

## 2022-02-19 ENCOUNTER — HEALTH MAINTENANCE LETTER (OUTPATIENT)
Age: 52
End: 2022-02-19

## 2022-03-07 ENCOUNTER — LAB (OUTPATIENT)
Dept: URGENT CARE | Facility: URGENT CARE | Age: 52
End: 2022-03-07
Attending: SURGERY
Payer: COMMERCIAL

## 2022-03-07 DIAGNOSIS — Z11.59 ENCOUNTER FOR SCREENING FOR OTHER VIRAL DISEASES: ICD-10-CM

## 2022-03-07 PROCEDURE — U0003 INFECTIOUS AGENT DETECTION BY NUCLEIC ACID (DNA OR RNA); SEVERE ACUTE RESPIRATORY SYNDROME CORONAVIRUS 2 (SARS-COV-2) (CORONAVIRUS DISEASE [COVID-19]), AMPLIFIED PROBE TECHNIQUE, MAKING USE OF HIGH THROUGHPUT TECHNOLOGIES AS DESCRIBED BY CMS-2020-01-R: HCPCS

## 2022-03-07 PROCEDURE — U0005 INFEC AGEN DETEC AMPLI PROBE: HCPCS

## 2022-03-08 LAB — SARS-COV-2 RNA RESP QL NAA+PROBE: NEGATIVE

## 2022-03-10 ENCOUNTER — APPOINTMENT (OUTPATIENT)
Dept: SURGERY | Facility: PHYSICIAN GROUP | Age: 52
End: 2022-03-10
Payer: COMMERCIAL

## 2022-03-10 ENCOUNTER — HOSPITAL ENCOUNTER (OUTPATIENT)
Facility: CLINIC | Age: 52
Discharge: HOME | End: 2022-03-10
Attending: SURGERY | Admitting: SURGERY
Payer: COMMERCIAL

## 2022-03-10 VITALS
TEMPERATURE: 98 F | HEART RATE: 71 BPM | BODY MASS INDEX: 28.64 KG/M2 | OXYGEN SATURATION: 98 % | HEIGHT: 68 IN | DIASTOLIC BLOOD PRESSURE: 81 MMHG | WEIGHT: 189 LBS | SYSTOLIC BLOOD PRESSURE: 109 MMHG | RESPIRATION RATE: 16 BRPM

## 2022-03-10 DIAGNOSIS — R22.2 MASS ON BACK: ICD-10-CM

## 2022-03-10 PROCEDURE — 88304 TISSUE EXAM BY PATHOLOGIST: CPT | Mod: TC | Performed by: SURGERY

## 2022-03-10 PROCEDURE — 272N000001 HC OR GENERAL SUPPLY STERILE: Performed by: SURGERY

## 2022-03-10 PROCEDURE — 360N000075 HC SURGERY LEVEL 2, PER MIN: Performed by: SURGERY

## 2022-03-10 PROCEDURE — 999N000141 HC STATISTIC PRE-PROCEDURE NURSING ASSESSMENT: Performed by: SURGERY

## 2022-03-10 PROCEDURE — 710N000012 HC RECOVERY PHASE 2, PER MINUTE: Performed by: SURGERY

## 2022-03-10 PROCEDURE — 250N000009 HC RX 250: Performed by: SURGERY

## 2022-03-10 PROCEDURE — 11400 EXC TR-EXT B9+MARG 0.5 CM<: CPT | Performed by: SURGERY

## 2022-03-10 RX ORDER — OXYCODONE HYDROCHLORIDE 5 MG/1
5 TABLET ORAL
Status: DISCONTINUED | OUTPATIENT
Start: 2022-03-10 | End: 2022-03-10 | Stop reason: HOSPADM

## 2022-03-10 RX ORDER — OXYCODONE HYDROCHLORIDE 5 MG/1
5-10 TABLET ORAL EVERY 4 HOURS PRN
Qty: 10 TABLET | Refills: 0 | Status: SHIPPED | OUTPATIENT
Start: 2022-03-10

## 2022-03-10 RX ORDER — ONDANSETRON 4 MG/1
4-8 TABLET, ORALLY DISINTEGRATING ORAL EVERY 8 HOURS PRN
Qty: 4 TABLET | Refills: 0 | Status: SHIPPED | OUTPATIENT
Start: 2022-03-10

## 2022-03-10 NOTE — OP NOTE
General Surgery Operative Note    Pre-operative diagnosis:  Right back mass   Post-operative diagnosis:  Right back cystic mass (5 cm)   Procedure:  Excision of right back mass (5 cm)   Surgeon: Catarino Zuñiga MD   Assistant(s): Turner Malik PA-C  - the PA's assistance was medically necessary in providing adequate exposure in the operating field, maintaining hemostasis, cuttting suture, clamping and ligating blood vessels, and visualization of the anatomic structures throughout the surgical procedure.     Anesthesia:  Local   Estimated blood loss: 5 cc's   Drains placed: None   Complications:  None   Findings:   Multilobulated cystic mass removed in its entirety.  This measured 5.0 cm in greatest dimension.     Indications for operation: This is a 51-year-old gentleman who had previously had a cyst removed from his right upper back.  He developed a recurrent mass in this region.  There is no obvious punctum.  Excision was recommended and the procedure, along with its risks and complications, was discussed with the patient.  He agreed to proceed.    Details of the operation: After informed consent, the patient was taken to the operating where he was sterilely prepped and draped in a prone position.  The area surrounding the lesion was infiltrated with local anesthetic.  An elliptical incision was made encompassing the patient's old scar.  Dissection was carried down until the wall of a cystic appearing mass was encountered.  This was then carefully dissected out from surrounding tissue.  There is a fair amount of fibrosis in the dissection plane.  The cyst had multiple lobulations, but was removed in its entirety.  Hemostasis was assured using electrocautery and suture ligation.  The incision was irrigated out.  The subdermal tissues were approximated and interrupted 2-0 Vicryl sutures.  The deep dermal tissues were approximated and interrupted 3-0 Vicryl sutures and skin was closed using skin adhesive.    The  patient tolerated the procedure well and was transferred to the recovery room in satisfactory condition.  Sponge and needle counts were correct at the close of the case.    Specimens:   ID Type Source Tests Collected by Time Destination   1 : Right back mass Tissue Back, Upper SURGICAL PATHOLOGY EXAM Catarino Zuñiga MD 3/10/2022  9:41 AM            Catarino Zuñiga MD

## 2022-03-11 LAB
PATH REPORT.COMMENTS IMP SPEC: NORMAL
PATH REPORT.COMMENTS IMP SPEC: NORMAL
PATH REPORT.FINAL DX SPEC: NORMAL
PATH REPORT.GROSS SPEC: NORMAL
PATH REPORT.MICROSCOPIC SPEC OTHER STN: NORMAL
PATH REPORT.RELEVANT HX SPEC: NORMAL
PHOTO IMAGE: NORMAL

## 2022-03-11 PROCEDURE — 88304 TISSUE EXAM BY PATHOLOGIST: CPT | Mod: 26 | Performed by: PATHOLOGY

## 2022-03-24 ENCOUNTER — TELEPHONE (OUTPATIENT)
Dept: SURGERY | Facility: CLINIC | Age: 52
End: 2022-03-24
Payer: COMMERCIAL

## 2022-03-24 NOTE — TELEPHONE ENCOUNTER
Attempted to call patient for post op check. No answer.  A message was left for patient to call back if they had any questions or concerns.     Turner Malik PA-C

## 2022-10-22 ENCOUNTER — HEALTH MAINTENANCE LETTER (OUTPATIENT)
Age: 52
End: 2022-10-22

## 2023-04-01 ENCOUNTER — HEALTH MAINTENANCE LETTER (OUTPATIENT)
Age: 53
End: 2023-04-01

## 2024-03-20 NOTE — DISCHARGE INSTRUCTIONS
[facility]  Progress Note  Name: Jairon Oconnell I  MRN: [id number]  Unit/Bed#: -01 I Date of Admission: 3/1/2024   Date of Service: 3/20/2024 I Hospital Day: 19    Assessment/Plan   * Ambulatory dysfunction  Assessment & Plan  With non-operative right hip greater trochanter fracture seen and evaluated by orthopedic surgery on the previous admission  He was discharged to a skilled nursing facility, became agitated, broke a chair, and was reported to become aggressive with staff.  The facility will now not take him back  Psychiatry recommendations appreciated. No indication for inpatient psychiatric hospitalization at this time.  Recommendation is rehab/SNF  PT/OT recommendations appreciated.  Medically clear for discharge to rehab facility   Case management following, input appreciated             Urinary retention  Assessment & Plan  S/p chapman catheter, since discontinued   Continue tamsulosin  Continue urinary retention protocol           Pressure ulcer of sacral region, stage 3 (HCC)  Assessment & Plan  POA  Wound care recommendations appreciated: Scarring to the left lateral buttock from stage 3 pressure injury in the past. Scarring is intact and blanchable and hyperpigmented   Turn and reposition every 2 hours, frequent offloading            Behavior concern in adult  Assessment & Plan  He has been cooperative without any agitation or behavioral issues   Seroquel XR decreased to 150 BID and 300 HS due to excessive sleepiness            Acquired hypothyroidism  Assessment & Plan  Continue levothyroxine 150 mcg p.o. every morning             Essential hypertension  Assessment & Plan  Continue lisinopril 10 mg daily  Monitor blood pressure             Mixed hyperlipidemia  Assessment & Plan  Continue Pravachol 80 mg oral daily at bedtime             Generalized convulsive epilepsy (HCC)  Assessment & Plan  Continue Depakote  mg daily, Depakote  mg daily, Depakote DR 1000  HOME CARE FOLLOWING MINOR SURGERY  SULY Araiza, ZORAIDA Watkins R. O Donnell, J. Shaheen    RESULTS:  If a biopsy of tissue was done, you may call for your final pathology report after 1p.m. two working days after surgery.  Otherwise, this will be reviewed with you at time of phone follow-up (described below).    INCISIONAL CARE:    If you have a dressing in place, keep clean and dry for 48 hours; you may replace the gauze if it becomes soiled.    After 48 hours you may remove the dressing and shower.  Do not submerse incision in water for 1 week.    If you have a Dermabond dressing (a type of skin glue), you may shower immediately.    Sutures which are beneath the skin will absorb and do not need to be removed.    Sutures you can see should be removed at your surgeon's office near 2 weeks postop, unless otherwise instructed.    If present, leave the steri-strips (white paper tapes) in place for 14 days after surgery.    If present, leave Dermabond glue in place until it wears/flakes off.    You may expect a small amount of drainage from your incision.    A lump/ridge under the incision is normal and will gradually resolve.  If it becomes red or very uncomfortable, contact the nurse at your surgeon's office to discuss whether this needs to be evaluated.    ACTIVITY:  Cautiously resume exercise and strenuous activities such as jogging, tennis, aerobics, etc. Also, be careful of stretching activities which affect the area of surgery for two weeks.    DIET:  Start with liquids and gradually resume your regular diet as tolerated.  Increased fluid intake is recommended. While taking pain medications, consider use of a stool softener, increase your fiber in your diet, or add a fiber supplement (like Metamucil, Citrucel) to help prevent constipation - a possible side effect of pain medications.    DISCOMFORT:  Local anesthetic placed at surgery should provide relief for 4-8 hours.  Begin taking pain  mg HS (per  must be brand name) Neurontin 400 mg 3 times daily, Vimpat 150 mg daily and 200 mg HS, zonisamide 300 mg HS, and Keppra 1500 mg every 12 hours.   Continue seizure precautions            Type 2 diabetes mellitus with diabetic neuropathy, without long-term current use of insulin (HCC)  Assessment & Plan  Lab Results   Component Value Date    HGBA1C 5.5 11/27/2023       Recent Labs     03/19/24  1119 03/19/24  1616 03/19/24 2053 03/20/24  0721   POCGLU 164* 160* 195* 132       Blood Sugar Average: Last 72 hrs:  (P) [id number]    Continue diabetic diet       Continue fingerstick blood sugar with sliding scale coverage  Hypoglycemia protocol     Ataxic cerebral palsy (HCC)  Assessment & Plan  Continue supportive care   Continue current medications                      VTE Pharmacologic Prophylaxis: VTE Score: 3 Moderate Risk (Score 3-4) - Pharmacological DVT Prophylaxis Ordered: enoxaparin (Lovenox).    Mobility:   Basic Mobility Inpatient Raw Score: 19  JH-HLM Goal: 6: Walk 10 steps or more  JH-HLM Achieved: 6: Walk 10 steps or more  HLM Goal NOT achieved. Continue with multidisciplinary rounding and encourage appropriate mobility to improve upon HLM goals.    Patient Centered Rounds: I performed bedside rounds with nursing staff today.   Discussions with Specialists or Other Care Team Provider: CM, OT/PT     Education and Discussions with Family / Patient:  patient was updated.     Total Time Spent on Date of Encounter in care of patient: 20 mins. This time was spent on one or more of the following: performing physical exam; counseling and coordination of care; obtaining or reviewing history; documenting in the medical record; reviewing/ordering tests, medications or procedures; communicating with other healthcare professionals and discussing with patient's family/caregivers.    Current Length of Stay: 19 day(s)  Current Patient Status: Inpatient   Certification Statement: The  pills before discomfort is severe.  Take the pain medication with some food, when possible, to minimize side effects.  Intermittent use of ice packs may help during the first 1-3 weeks after surgery.  Expect gradual improvement.    Over-the-counter anti-inflammatory medications (i.e. Ibuprofen/Advil/Motrin or Naprosyn/Aleve) may be used per package instructions in addition to or while tapering off the narcotic pain medications to decrease swelling and sensitivity.  DO NOT TAKE these Anti-inflammatory medications if your primary physician has advised against doing so, or if you have acid reflux, ulcer, or bleeding disorder, or take blood-thinner medications.  Call your primary physician or the surgery office if you have medication questions.      FOLLOW-UP AFTER SURGERY:  -Our office will contact you approximately 2-3 weeks after surgery to check on your progress and answer any questions you may have.  If you are doing well, you will not need to return for an office appointment.  If any concerns are identified over the phone, we will help you make an appointment to see a provider.    -If you have not received a phone call, have any questions or concerns, or would like to be seen, please call us at [phone].  We are located at: [address], [address]; [place] [address]    -CONTACT US IF THE FOLLOWING DEVELOPS:   1. A fever that is above 101     2. Increased redness, warmth, drainage, bleeding, or swelling.   3. Pain that is not relieved by rest/ice and your prescription.   4.  Increasing pain after 48 hours.   5. Drainage that is thick, cloudy, yellow, green or white.   6. Any other questions or concerns.      FREQUENTLY ASKED QUESTIONS:    Q:  How should my incision look?    A:  Normally your incision will appear slightly swollen with light redness directly along the incision itself as it heals.  It may feel like a bump or ridge as the healing/scarring happens, and over time (3-4 months) this bump or  patient will continue to require additional inpatient hospital stay due to ambulatory gait dysfunction  Discharge Plan: Anticipate discharge in >72 hrs to rehab facility.CM is in the process of securing a facility for discharge.     Code Status: Level 1 - Full Code    Subjective:   The patient was seen and examined.  No acute event overnight. He is asking for his activity book.     Objective:     Vitals:   Temp (24hrs), Av.2 °F (36.2 °C), Min:96.8 °F (36 °C), Max:97.6 °F (36.4 °C)    Temp:  [96.8 °F (36 °C)-97.6 °F (36.4 °C)] 96.8 °F (36 °C)  HR:  [63] 63  Resp:  [16-18] 18  BP: (123-136)/(71-80) 136/80  SpO2:  [96 %] 96 %  Body mass index is 22.42 kg/m².     Input and Output Summary (last 24 hours):   No intake or output data in the 24 hours ending 24 0935        Physical Exam:   Physical Exam  Vitals and nursing note reviewed.   Constitutional:       General: He is not in acute distress.     Appearance: Normal appearance.      Comments: Frail and appearing older than age   HENT:      Head: Normocephalic and atraumatic.      Right Ear: External ear normal.      Left Ear: External ear normal.      Nose: Nose normal.      Mouth/Throat:      Mouth: Mucous membranes are moist.      Pharynx: Oropharynx is clear.   Eyes:      General:         Right eye: No discharge.         Left eye: No discharge.      Extraocular Movements: Extraocular movements intact.      Pupils: Pupils are equal, round, and reactive to light.   Cardiovascular:      Rate and Rhythm: Normal rate and regular rhythm.      Pulses: Normal pulses.      Heart sounds: Normal heart sounds. No murmur heard.  Pulmonary:      Effort: Pulmonary effort is normal. No respiratory distress.      Breath sounds: Normal breath sounds. No wheezing or rales.   Abdominal:      General: Bowel sounds are normal. There is no distension.      Palpations: Abdomen is soft. There is no mass.      Tenderness: There is no abdominal tenderness.   Musculoskeletal:          ridge feeling should slowly go away.  In general, clear or pink watery drainage can be normal at first as your incision heals, but should decrease over time.    Q:  How do I know if my incision is infected?  A:  Look at your incision for signs of infection, like redness around the incision spreading to surrounding skin, or drainage of cloudy or foul-smelling drainage.  If you feel warm, check your temperature to see if you are running a fever.    **If any of these things occur, please notify the nurse at our office.  We may need you to come into the office for an incision check.      Q:  How do I take care of my incision?  A:  If you have a dressing in place - Starting the day after surgery, replace the dressing 1-2 times a day until there is no further drainage from the incision.  At that time, a dressing is no longer needed.  Try to minimize tape on the skin if irritation is occurring at the tape sites.  If you have significant irritation from tape on the skin, please call the office to discuss other method of dressing your incision.    Small pieces of tape called  steri-strips  may be present directly overlying your incision; these may be removed 10 days after surgery unless otherwise specified by your surgeon.  If these tapes start to loosen at the ends, you may trim them back until they fall off or are removed.    A:  If you had  Dermabond  tissue glue used as a dressing (this causes your incision to look shiny with a clear covering over it) - This type of dressing wears off with time and does not require more dressings over the top unless it is draining around the glue as it wears off.  Do not apply ointments or lotions over the incisions until the glue has completely worn off.    Q:  There is a piece of tape or a sticky  lead  still on my skin.  Can I remove this?  A:  Sometimes the sticky  leads  used for monitoring during surgery or for evaluation in the emergency department are not all removed while you  General: No swelling, tenderness or deformity. Normal range of motion.      Cervical back: Normal range of motion and neck supple. No rigidity.   Skin:     General: Skin is warm and dry.      Capillary Refill: Capillary refill takes less than 2 seconds.      Coloration: Skin is not pale.      Findings: No erythema.   Neurological:      Mental Status: He is alert. Mental status is at baseline.      Comments: With periods of forgetfulness/confusion. Upper extremities tremors and ataxia. At baseline.    Psychiatric:         Attention and Perception: Attention normal.         Mood and Affect: Affect is flat.         Behavior: Behavior normal. Behavior is cooperative.         Cognition and Memory: Cognition is impaired.       Additional Data:     Labs:                      Results from last 7 days   Lab Units 03/20/24  0721 03/19/24  2053 03/19/24  1616 03/19/24  1119 03/19/24  0726 03/18/24  2141 03/18/24  1613 03/18/24  1059 03/18/24  0750 03/17/24  2101 03/17/24  1625 03/17/24  1103   POC GLUCOSE mg/dl 132 195* 160* 164* 120 167* 131 126 118 154* 135 147*               Lines/Drains:  Invasive Devices       None                 Urinary Catheter:  Goal for removal: Voiding trial when ambulation improves               Imaging: Reviewed radiology reports from this admission including: CT head and xray(s)    Recent Cultures (last 7 days):         Last 24 Hours Medication List:   Current Facility-Administered Medications   Medication Dose Route Frequency Provider Last Rate    acetaminophen  650 mg Oral 4x Daily PRN Lizzie Jimi, CRNP      divalproex sodium  250 mg Oral Daily Lizzie Jimi, CRNP      divalproex sodium  1,000 mg Oral HS Lizzie Jimi, CRNP      divalproex sodium  500 mg Oral Daily Lizzie Jimi, CRNP      docusate sodium  100 mg Oral BID PRN Hasmukh Becker PA-C      enoxaparin  40 mg Subcutaneous Daily Hasmukh Becker PA-C      gabapentin  400 mg Oral TID Hasmukh Becker PA-C      ibuprofen  600 mg  are in the hospital.  These sometimes have a tab or metal dot on them.  You can easily remove these on your own, like taking off a band-aid.  If there is a gel substance under the  lead , simply wipe/clean it off with a washcloth or paper towel.      Q:  What can I do to minimize constipation (very hard stools, or lack of stools)?  A:  Stay well hydrated.  Increase your dietary fiber intake or take a fiber supplement -with plenty of water.  Walk around frequently.  You may consider an over-the-counter stool-softener.  Your Pharmacist can assist you with choosing one that is stocked at your pharmacy.  Constipation is also one of the most common side effects of pain medication.  If you are using pain medication, be pro-active and try to PREVENT problems with constipation by taking the steps above BEFORE constipation becomes a problem.    Q:  What do I do if I need more pain medications?  A:  Call the office to receive refills.  Be aware that certain pain meds cannot be called into a pharmacy and actually require a paper prescription.  A change may be made in your pain med as you progress thru your recovery period or if you have side effects to certain meds.    --Pain meds are NOT refilled after 5pm on weekdays, and NOT AT ALL on the weekends, so please look ahead to prevent problems.      Q:  Why am I having a hard time sleeping now that I am at home?  A:  Many medications you receive while you are in the hospital can impact your sleep for a number of days after your surgery/hospitalization.  Decreased level of activity and naps during the day may also make sleeping at night difficult.  Try to minimize day-time naps, and get up frequently during the day to walk around your home during your recovery time.  Sleep aides may be of some help, but are not recommended for long-term use.      Q:  I am having some back discomfort.  What should I do?  A:  This may be related to certain positioning that was required for your  Oral Q6H PRN Ancelmo Mota PA-C      insulin lispro  1-5 Units Subcutaneous TID AC Hasmukh Becker PA-C      insulin lispro  1-6 Units Subcutaneous HS Hasmukh Becker PA-C      lacosamide  150 mg Oral Daily Hasmukh Becker PA-C      lacosamide  200 mg Oral HS Hasmukh Becker PA-C      levETIRAcetam  1,500 mg Oral Q12H RONALD Hasmukh Becker PA-C      levothyroxine  150 mcg Oral Daily Hasmukh Becker PA-C      lisinopril  10 mg Oral Daily Hasmukh Becker PA-C      loratadine  10 mg Oral Daily Hasmukh Becker PA-C      multivitamin stress formula  1 tablet Oral Daily Hasmukh Becker PA-C      OLANZapine  5 mg Intramuscular Q3H PRN Hasmukh Becker PA-C      pravastatin  80 mg Oral Daily With Dinner Hasmukh Becker PA-C      QUEtiapine  150 mg Oral BID HUNTER Lopez      QUEtiapine  300 mg Oral HS HUNTER Lopez      tamsulosin  0.4 mg Oral Daily With Dinner HUNTER Lopez      temazepam  15 mg Oral HS PRN Hasmukh Becker PA-C      zonisamide  300 mg Oral HS Hasmukh Becker PA-C          Today, Patient Was Seen By: HUNTER Lopez    **Please Note: This note may have been constructed using a voice recognition system.**     surgery, extended periods of time in bed, or other changes in your overall activity level.  You may try ice, heat, acetaminophen, or ibuprofen to treat this temporarily.  Note that many pain medications have acetaminophen in them and would state this on the prescription bottle.  Be sure not to exceed the maximum of 4000mg per day of acetaminophen.     **If the pain you are having does not resolve, is severe, or is a flare of back pain you have had on other occasions prior to surgery, please contact your primary physician for further recommendations or for an appointment to be examined at their office.    Q:  Why am I having headaches?  A:  Headaches can be caused by many things:  caffeine withdrawal, use of pain meds, dehydration, high blood pressure, lack of sleep, over-activity/exhaustion, flare-up of usual migraine headaches.  If you feel this is related to muscle tension (a band-like feeling around the head, or a pressure at the low-back of the head) you may try ice or heat to this area.  You may need to drink more fluids (try electrolyte drink like Gatorade), rest, or take your usual migraine medications.   **If your headaches do not resolve, worsen, are accompanied by other symptoms, or if your blood pressure is high, please call your primary physician for recommendation and/or examination.    Q:  I am unable to urinate.  What do I do?  A:  A small percentage of people can have difficulty urinating initially after surgery.  This includes being able to urinate only a very small amount at a time and feeling discomfort or pressure in the very low abdomen.  This is called  urinary retention , and is actually an urgent situation.  Proceed to your nearest Emergency department for evaluation (not an Urgent Care Center).  Sometimes the bladder does not work correctly after certain medications you receive during surgery, or related to certain procedures.  You may need to have a catheter placed until your bladder  recovers.  When planning to go to an Emergency department, it may help to call the ER to let them know you are coming in for this problem after a surgery.  This may help you get in quicker to be evaluated.  **If you have symptoms of a urinary tract infection, please contact your primary physician for the proper evaluation and treatment.        If you have other questions, please call the office Monday thru Friday between 8am and 4:30pm to discuss with the nurse or physician assistant.  [phone]    There is a surgeon ON CALL on weekday evenings and over the weekend in case of urgent need only, and may be contacted at the same number.    If you are having an emergency, call 911 or proceed to your nearest emergency department.

## 2024-06-02 ENCOUNTER — HEALTH MAINTENANCE LETTER (OUTPATIENT)
Age: 54
End: 2024-06-02

## (undated) DEVICE — SU VICRYL 2-0 SH 27" J317H

## (undated) DEVICE — GLOVE PROTEXIS POWDER FREE 8.0 ORTHOPEDIC 2D73ET80

## (undated) DEVICE — BAG CLEAR TRASH 1.3M 39X33" P4040C

## (undated) DEVICE — PACK MINOR CUSTOM RIDGES SBA32RMRMA

## (undated) DEVICE — SU VICRYL 4-0 P-3 18" UND J494G

## (undated) DEVICE — LINEN HALF SHEET 5512

## (undated) DEVICE — GOWN IMPERVIOUS ZONED XLG 9041

## (undated) DEVICE — DRAPE LAP W/ARMBOARD 29410

## (undated) DEVICE — ADH SKIN CLOSURE PREMIERPRO EXOFIN MICOR HV 0.5ML 3471

## (undated) DEVICE — LINEN FULL SHEET 5511

## (undated) DEVICE — LINEN TOWEL PACK X10 5473

## (undated) DEVICE — GLOVE PROTEXIS POWDER FREE SMT 7.5  2D72PT75X

## (undated) DEVICE — ESU GROUND PAD ADULT W/CORD E7507

## (undated) DEVICE — PREP CHLORAPREP 26ML TINTED HI-LITE ORANGE 930815

## (undated) DEVICE — SU VICRYL 3-0 SH 27" UND J416H

## (undated) RX ORDER — BUPIVACAINE HYDROCHLORIDE 5 MG/ML
INJECTION, SOLUTION EPIDURAL; INTRACAUDAL
Status: DISPENSED
Start: 2022-03-10

## (undated) RX ORDER — LIDOCAINE HYDROCHLORIDE AND EPINEPHRINE 10; 10 MG/ML; UG/ML
INJECTION, SOLUTION INFILTRATION; PERINEURAL
Status: DISPENSED
Start: 2022-03-10